# Patient Record
Sex: FEMALE | Race: AMERICAN INDIAN OR ALASKA NATIVE
[De-identification: names, ages, dates, MRNs, and addresses within clinical notes are randomized per-mention and may not be internally consistent; named-entity substitution may affect disease eponyms.]

---

## 2017-04-28 ENCOUNTER — HOSPITAL ENCOUNTER (OUTPATIENT)
Dept: HOSPITAL 5 - MAMMO | Age: 77
Discharge: HOME | End: 2017-04-28
Attending: FAMILY MEDICINE
Payer: MEDICARE

## 2017-04-28 DIAGNOSIS — Z12.31: Primary | ICD-10-CM

## 2017-04-28 PROCEDURE — G0202 SCR MAMMO BI INCL CAD: HCPCS

## 2017-04-28 PROCEDURE — 77067 SCR MAMMO BI INCL CAD: CPT

## 2017-04-28 NOTE — MAMMOGRAPHY REPORT
BILATERAL MAMMOGRAM:



FINDINGS:

The breast tissue is heterogeneously dense, which could obscure

detection of small masses (approximately 50%-75% glandular).  No mass, 

distortion, suspicious calcification, or skin change is seen. No 

interval change compared to prior study in March 2014.



CAD was utilized.



IMPRESSION:

Negative mammogram.  There is no mammographic evidence of

malignancy.



RECOMMENDATION:

Follow-up per ACS guidelines.



BI-RADS CATEGORY:  1 = Negative



ACR BI-RADS MAMMOGRAPHIC CODES:

0 = Needs additional imaging evaluation; 1 = Negative; 2 = Benign; 3 = 

Probably benign; 4 = Suspicious; 5 = Malignant; 6 = Known biopsy-proven 

malignancy



COMMENT:

      1.   Dense breast tissue, i.e., adenosis, fibrocystic 

            changes, etc., may obscure an underlying neoplasm.

      2.   Approximately 10% of cancers are not detected with

            mammography.

      3.   A negative mammography report should not delay biopsy 

            if a clinically suspicious mass is present.



COMMENT:

Patient follow-up letters are generated in [employer].

## 2018-06-13 ENCOUNTER — HOSPITAL ENCOUNTER (OUTPATIENT)
Dept: HOSPITAL 5 - MAMMO | Age: 78
Discharge: HOME | End: 2018-06-13
Attending: FAMILY MEDICINE
Payer: MEDICARE

## 2018-06-13 DIAGNOSIS — Z12.31: Primary | ICD-10-CM

## 2018-06-13 PROCEDURE — 77067 SCR MAMMO BI INCL CAD: CPT

## 2018-06-13 NOTE — MAMMOGRAPHY REPORT
BILATERAL DIGITAL SCREENING MAMMOGRAM with CAD: 06/13/18 07:22:00



CLINICAL: Routine screening.



COMPARISON:04/28/17



FINDINGS: The breasts are heterogeneously dense, which may obscure 

small masses.The patient has limited range of motion in her neck and 

could not move her chin out of the way for the right MLO view. No mass, 

architectural distortion or suspicious calcifications.



IMPRESSION: No mammographic evidence of malignancy.



BI-RADS CATEGORY: 1 - - Negative



RECOMMENDATION: Routine mammographic screening in one year.





COMMENT:

Patient follow-up letters are generated by our [employer] application.

## 2019-06-14 ENCOUNTER — HOSPITAL ENCOUNTER (OUTPATIENT)
Dept: HOSPITAL 5 - MAMMO | Age: 79
Discharge: HOME | End: 2019-06-14
Attending: FAMILY MEDICINE
Payer: MEDICARE

## 2019-06-14 DIAGNOSIS — Z12.31: Primary | ICD-10-CM

## 2019-06-14 PROCEDURE — 77067 SCR MAMMO BI INCL CAD: CPT

## 2019-06-14 NOTE — MAMMOGRAPHY REPORT
BILATERAL DIGITAL SCREENING MAMMOGRAM with CAD: 06/14/19 06:31:00



CLINICAL: Routine screening.



COMPARISON:04/28/17



FINDINGS: The breasts are heterogeneously dense, which may obscure 

small masses. No mass, architectural distortion or suspicious 

calcifications.



IMPRESSION: No mammographic evidence of malignancy.



BI-RADS CATEGORY: 1 - - Negative



RECOMMENDATION: Routine mammographic screening in one year.





COMMENT:

Patient follow-up letters are generated by our [employer] application.